# Patient Record
Sex: MALE | Race: WHITE | URBAN - METROPOLITAN AREA
[De-identification: names, ages, dates, MRNs, and addresses within clinical notes are randomized per-mention and may not be internally consistent; named-entity substitution may affect disease eponyms.]

---

## 2017-02-08 ENCOUNTER — TELEPHONE (OUTPATIENT)
Dept: FAMILY MEDICINE CLINIC | Facility: CLINIC | Age: 37
End: 2017-02-08

## 2017-02-08 DIAGNOSIS — S99.921A INJURY OF RIGHT GREAT TOE, INITIAL ENCOUNTER: Primary | ICD-10-CM

## 2017-02-08 NOTE — TELEPHONE ENCOUNTER
Called patient regarding appt this evening and asked him about the reason for his visit due to our xray machine is down. Patient stated that approx. 1 week ago his right great toe jammed in door accidentally.   He states that the nail is almost detached,

## 2017-03-01 ENCOUNTER — TELEPHONE (OUTPATIENT)
Dept: FAMILY MEDICINE CLINIC | Facility: CLINIC | Age: 37
End: 2017-03-01

## 2017-03-02 NOTE — TELEPHONE ENCOUNTER
Patient informed that Dr. Adam Chow does not have an opening tonite and we understand that he works until [facility] in [facility]. Patient states he understands and will go to the [facility] after his appt here with Nishant Craig.

## 2017-10-04 ENCOUNTER — OFFICE VISIT (OUTPATIENT)
Dept: FAMILY MEDICINE CLINIC | Facility: CLINIC | Age: 37
End: 2017-10-04

## 2017-10-04 VITALS
HEART RATE: 80 BPM | TEMPERATURE: 98 F | RESPIRATION RATE: 16 BRPM | BODY MASS INDEX: 31.64 KG/M2 | HEIGHT: 70 IN | DIASTOLIC BLOOD PRESSURE: 76 MMHG | SYSTOLIC BLOOD PRESSURE: 120 MMHG | WEIGHT: 221 LBS

## 2017-10-04 DIAGNOSIS — G44.229 CHRONIC TENSION-TYPE HEADACHE, NOT INTRACTABLE: Primary | ICD-10-CM

## 2017-10-04 PROCEDURE — 99214 OFFICE O/P EST MOD 30 MIN: CPT | Performed by: FAMILY MEDICINE

## 2017-10-04 RX ORDER — NAPROXEN 500 MG/1
500 TABLET ORAL 2 TIMES DAILY WITH MEALS
Qty: 40 TABLET | Refills: 2 | Status: SHIPPED | OUTPATIENT
Start: 2017-10-04 | End: 2017-11-01

## 2017-10-04 RX ORDER — SUMATRIPTAN 50 MG/1
50 TABLET, FILM COATED ORAL EVERY 2 HOUR PRN
Qty: 9 TABLET | Refills: 3 | Status: SHIPPED | OUTPATIENT
Start: 2017-10-04 | End: 2017-11-15

## 2017-10-04 NOTE — PATIENT INSTRUCTIONS
Start medication naprosyn - take regularly for 1 week , then as needed. imitrex at onset of headache, can repeat after 1 hour if needed.

## 2017-10-05 NOTE — PROGRESS NOTES
Chief Complaint:   Patient presents with:  Sinus Problem: Patient stated hes been having a constant headache behind the left eye, he has been a little sensitive to light and has felt dizzy      HPI:   This is a 30year old male coming in for intermittent h Denies shortness of breath, wheezing, cough or sputum production.     GASTROINTESTINAL:  Denies abdominal pain, nausea, vomiting, constipation, diarrhea  : denies dysuria,   MUSCULOSKELETAL:  Denies weakness, muscle aches,     NEUROLOGICAL:see HPI     HEM Visit:  Signed Prescriptions Disp Refills    naproxen 500 MG Oral Tab 40 tablet 2      Sig: Take 1 tablet (500 mg total) by mouth 2 (two) times daily with meals.       SUMAtriptan Succinate (IMITREX) 50 MG Oral Tab 9 tablet 3      Sig: Take 1 tablet (50 mg

## 2017-11-01 ENCOUNTER — OFFICE VISIT (OUTPATIENT)
Dept: FAMILY MEDICINE CLINIC | Facility: CLINIC | Age: 37
End: 2017-11-01

## 2017-11-01 VITALS
WEIGHT: 225.5 LBS | HEIGHT: 70.5 IN | BODY MASS INDEX: 31.92 KG/M2 | HEART RATE: 84 BPM | DIASTOLIC BLOOD PRESSURE: 78 MMHG | SYSTOLIC BLOOD PRESSURE: 122 MMHG | RESPIRATION RATE: 18 BRPM | TEMPERATURE: 97 F

## 2017-11-01 DIAGNOSIS — R51.9 INTRACTABLE HEADACHE, UNSPECIFIED CHRONICITY PATTERN, UNSPECIFIED HEADACHE TYPE: Primary | ICD-10-CM

## 2017-11-01 PROCEDURE — 99214 OFFICE O/P EST MOD 30 MIN: CPT | Performed by: FAMILY MEDICINE

## 2017-11-01 RX ORDER — SULFAMETHOXAZOLE AND TRIMETHOPRIM 800; 160 MG/1; MG/1
1 TABLET ORAL 2 TIMES DAILY
Qty: 20 TABLET | Refills: 0 | Status: SHIPPED | OUTPATIENT
Start: 2017-11-01 | End: 2017-11-15

## 2017-11-02 NOTE — PROGRESS NOTES
[facility] [place]  PROGRESS NOTE  Chief Complaint:   Patient presents with:  Sinus Problem  Headache      HPI:   This is a 30year old male coming in for 10 years headaches. This patient was seen last month for left frontal headaches.   Patient SUMAtriptan Succinate (IMITREX) 50 MG Oral Tab Take 1 tablet (50 mg total) by mouth every 2 (two) hours as needed for Migraine.  Use at onset; repeat once after 2 HRS-ONLY 2 IN 24 HR MAX Disp: 9 tablet Rfl: 3      Counseling given: Not Answered       REVI bruising, good turgor. HEART:  Regular rate and rhythm, no murmurs, rubs or gallops. Neuro: Cranial nerves II through XII are intact. Sensation motor normal.    ASSESSMENT AND PLAN:   Rosa Kurtz was seen today for sinus problem and headache.     Diagnoses and

## 2017-11-09 ENCOUNTER — TELEPHONE (OUTPATIENT)
Dept: FAMILY MEDICINE CLINIC | Facility: CLINIC | Age: 37
End: 2017-11-09

## 2017-11-09 NOTE — TELEPHONE ENCOUNTER
Pt was seen last week. Was given an abx which has helped with the headache but not the congestion or sinus pressure. Pt has tried Tresia Abbe but did not help. What else can pt take with the congestion and sinus pressure. Please advise.

## 2017-11-09 NOTE — TELEPHONE ENCOUNTER
Informed pt of the recommendations below. Pt will try these meds and call if no improvement. Pt expressed understanding and thanks.

## 2017-11-09 NOTE — TELEPHONE ENCOUNTER
I would like him to try an antihistamine namely Claritin or Zyrtec, taking this daily. I would also like him to try Flonase nasal spray taking 2 puffs each nostril at bedtime. Both of these medications are over-the-counter. Recheck if no improvement.

## 2017-11-15 ENCOUNTER — OFFICE VISIT (OUTPATIENT)
Dept: FAMILY MEDICINE CLINIC | Facility: CLINIC | Age: 37
End: 2017-11-15

## 2017-11-15 VITALS
BODY MASS INDEX: 32.93 KG/M2 | HEIGHT: 70 IN | WEIGHT: 230 LBS | DIASTOLIC BLOOD PRESSURE: 82 MMHG | SYSTOLIC BLOOD PRESSURE: 116 MMHG | HEART RATE: 88 BPM | TEMPERATURE: 97 F

## 2017-11-15 DIAGNOSIS — J32.9 SINUSITIS, UNSPECIFIED CHRONICITY, UNSPECIFIED LOCATION: Primary | ICD-10-CM

## 2017-11-15 PROCEDURE — 99213 OFFICE O/P EST LOW 20 MIN: CPT | Performed by: FAMILY MEDICINE

## 2017-11-15 RX ORDER — PREDNISONE 20 MG/1
TABLET ORAL
Qty: 24 TABLET | Refills: 0 | Status: SHIPPED | OUTPATIENT
Start: 2017-11-15 | End: 2018-03-05

## 2017-11-16 NOTE — PATIENT INSTRUCTIONS
Prednisone prescribed. Continue with antihistamine and Flonase nasal inhaler. Call at the completion of the prednisone for progress report.

## 2017-11-16 NOTE — PROGRESS NOTES
[facility] [place]  PROGRESS NOTE  Chief Complaint:   Patient presents with:  Sinus Problem  Headache      HPI:   This is a 30year old male coming in for continued facial pressure.   Patient feels that he has sinus problems and possible infectio then qd for 3 days Disp: 24 tablet Rfl: 0      Counseling given: Not Answered       REVIEW OF SYSTEMS:   Denies eye, ear, nose, throat, cardiovascular, respiratory, gastrointestinal, genitourinary, musculoskeletal, skin, or neurological sxs other than thos worsening or changing symptoms. Patient is to call with any side effects or complications from the treatments as a result of today.      Problem List:  Patient Active Problem List:     Inflammatory bowel disease     Personal history of urinary calculi

## 2017-11-28 ENCOUNTER — TELEPHONE (OUTPATIENT)
Dept: FAMILY MEDICINE CLINIC | Facility: CLINIC | Age: 37
End: 2017-11-28

## 2017-11-28 NOTE — TELEPHONE ENCOUNTER
Patient states that he is calling with an update for Dr. Cornelius Davila who he was seen by last.  He states that he completed the prednisone and his headaches are gone but his sinuses still feel full.   Patient states that it is ok to wait until tomorrow when

## 2017-11-29 NOTE — TELEPHONE ENCOUNTER
Patient's headaches are resolved with the prednisone but still has fullness in the sinuses. Tell patient that this is consistent with chronic sinusitis.   I would highly recommend him seeing the ear nose and throat physician again as well as continuing wit

## 2017-11-29 NOTE — TELEPHONE ENCOUNTER
Informed pt of the recommendations. Pt will schedule appt with ENT. Pt expressed understanding and thanks.

## 2018-03-05 ENCOUNTER — OFFICE VISIT (OUTPATIENT)
Dept: FAMILY MEDICINE CLINIC | Facility: CLINIC | Age: 38
End: 2018-03-05

## 2018-03-05 VITALS
SYSTOLIC BLOOD PRESSURE: 128 MMHG | DIASTOLIC BLOOD PRESSURE: 88 MMHG | TEMPERATURE: 98 F | BODY MASS INDEX: 32.76 KG/M2 | WEIGHT: 228.81 LBS | HEART RATE: 80 BPM | HEIGHT: 70 IN | RESPIRATION RATE: 16 BRPM

## 2018-03-05 DIAGNOSIS — Z23 NEED FOR TDAP VACCINATION: ICD-10-CM

## 2018-03-05 DIAGNOSIS — Z00.00 PHYSICAL EXAM: Primary | ICD-10-CM

## 2018-03-05 PROCEDURE — 99395 PREV VISIT EST AGE 18-39: CPT | Performed by: FAMILY MEDICINE

## 2018-03-05 PROCEDURE — 90715 TDAP VACCINE 7 YRS/> IM: CPT | Performed by: FAMILY MEDICINE

## 2018-03-05 PROCEDURE — 90471 IMMUNIZATION ADMIN: CPT | Performed by: FAMILY MEDICINE

## 2018-03-05 RX ORDER — FLUTICASONE PROPIONATE 50 MCG
SPRAY, SUSPENSION (ML) NASAL
COMMUNITY

## 2018-03-06 NOTE — PROGRESS NOTES
[facility]      HPI:   Leopoldo Galindo is a 30year old male who presents for an Annual Health Visit.          Allergies:     C-Phen                  Other (See Comments)    Comment:\"passed out\"  Allerx                      Comment:O motor complaint  PSYCHE: no symptoms of depression or anxiety  HEMATOLOGY: denies hx anemia; denies bruising or excessive bleeding  ENDOCRINE: denies excessive thirst or urination; denies unexpected wt gain or wt loss  ALLERGY/IMM.: denies food or seasonal Future    Need for Tdap vaccination  -     TETANUS, DIPHTHERIA TOXOIDS AND ACELLULAR PERTUSIS VACCINE (TDAP), >7 YEARS, IM USE        Patient Instructions   Tdap given today. Recommend healthy diet, exercise and weight loss.    Return to clinic for fastin

## 2018-03-06 NOTE — PATIENT INSTRUCTIONS
Tdap given today. Recommend healthy diet, exercise and weight loss. Return to clinic for fasting labs. Recommend 1200 mg of calcium and 800 units of vitamin D. Return to clinic if any concern.

## 2018-03-07 ENCOUNTER — TELEPHONE (OUTPATIENT)
Dept: FAMILY MEDICINE CLINIC | Facility: CLINIC | Age: 38
End: 2018-03-07

## 2018-03-07 NOTE — TELEPHONE ENCOUNTER
Patient calling today after having sxs from Tdap vaccination that was given on 3/5. He started having sxs 24 hours after vaccination: started to get headache/chills and temp was 99.9 yesterday and today at 5 pm was 100.4.  He states he has been taking ibupr

## 2018-03-08 NOTE — TELEPHONE ENCOUNTER
Pt informed that this is a normal reaction to the vaccination. Dr. Kassandra Duenas states he doesn't think this is a allergic reaction, just more of a reaction to the vaccination and its effect on the body.  He recommends to alternate between tylenol and ibu to get fev

## 2018-03-10 ENCOUNTER — LABORATORY ENCOUNTER (OUTPATIENT)
Dept: LAB | Age: 38
End: 2018-03-10
Attending: FAMILY MEDICINE
Payer: COMMERCIAL

## 2018-03-10 DIAGNOSIS — Z00.00 PHYSICAL EXAM: ICD-10-CM

## 2018-03-10 LAB
ALBUMIN SERPL-MCNC: 3.4 G/DL (ref 3.5–4.8)
ALP LIVER SERPL-CCNC: 80 U/L (ref 45–117)
ALT SERPL-CCNC: 36 U/L (ref 17–63)
AST SERPL-CCNC: 29 U/L (ref 15–41)
BASOPHILS # BLD AUTO: 0.03 X10(3) UL (ref 0–0.1)
BASOPHILS NFR BLD AUTO: 0.6 %
BILIRUB SERPL-MCNC: 0.4 MG/DL (ref 0.1–2)
BILIRUB UR QL STRIP.AUTO: NEGATIVE
BUN BLD-MCNC: 18 MG/DL (ref 8–20)
CALCIUM BLD-MCNC: 8.8 MG/DL (ref 8.3–10.3)
CHLORIDE: 106 MMOL/L (ref 101–111)
CHOLEST SMN-MCNC: 218 MG/DL (ref ?–200)
CLARITY UR REFRACT.AUTO: CLEAR
CO2: 27 MMOL/L (ref 22–32)
COLOR UR AUTO: YELLOW
CREAT BLD-MCNC: 0.92 MG/DL (ref 0.7–1.3)
EOSINOPHIL # BLD AUTO: 0.29 X10(3) UL (ref 0–0.3)
EOSINOPHIL NFR BLD AUTO: 5.9 %
ERYTHROCYTE [DISTWIDTH] IN BLOOD BY AUTOMATED COUNT: 12.4 % (ref 11.5–16)
EST. AVERAGE GLUCOSE BLD GHB EST-MCNC: 108 MG/DL (ref 68–126)
GLUCOSE BLD-MCNC: 92 MG/DL (ref 70–99)
GLUCOSE UR STRIP.AUTO-MCNC: NEGATIVE MG/DL
HBA1C MFR BLD HPLC: 5.4 % (ref ?–5.7)
HCT VFR BLD AUTO: 41.6 % (ref 37–53)
HDLC SERPL-MCNC: 43 MG/DL (ref 45–?)
HDLC SERPL: 5.07 {RATIO} (ref ?–4.97)
HGB BLD-MCNC: 14.1 G/DL (ref 13–17)
IMMATURE GRANULOCYTE COUNT: 0.01 X10(3) UL (ref 0–1)
IMMATURE GRANULOCYTE RATIO %: 0.2 %
KETONES UR STRIP.AUTO-MCNC: NEGATIVE MG/DL
LDLC SERPL CALC-MCNC: 156 MG/DL (ref ?–130)
LEUKOCYTE ESTERASE UR QL STRIP.AUTO: NEGATIVE
LYMPHOCYTES # BLD AUTO: 1.59 X10(3) UL (ref 0.9–4)
LYMPHOCYTES NFR BLD AUTO: 32.5 %
M PROTEIN MFR SERPL ELPH: 6.8 G/DL (ref 6.1–8.3)
MCH RBC QN AUTO: 31.6 PG (ref 27–33.2)
MCHC RBC AUTO-ENTMCNC: 33.9 G/DL (ref 31–37)
MCV RBC AUTO: 93.3 FL (ref 80–99)
MONOCYTES # BLD AUTO: 0.54 X10(3) UL (ref 0.1–1)
MONOCYTES NFR BLD AUTO: 11 %
NEUTROPHIL ABS PRELIM: 2.43 X10 (3) UL (ref 1.3–6.7)
NEUTROPHILS # BLD AUTO: 2.43 X10(3) UL (ref 1.3–6.7)
NEUTROPHILS NFR BLD AUTO: 49.8 %
NITRITE UR QL STRIP.AUTO: NEGATIVE
NONHDLC SERPL-MCNC: 175 MG/DL (ref ?–130)
PH UR STRIP.AUTO: 5 [PH] (ref 4.5–8)
PLATELET # BLD AUTO: 163 10(3)UL (ref 150–450)
POTASSIUM SERPL-SCNC: 3.9 MMOL/L (ref 3.6–5.1)
PROT UR STRIP.AUTO-MCNC: NEGATIVE MG/DL
RBC # BLD AUTO: 4.46 X10(6)UL (ref 4.3–5.7)
RBC UR QL AUTO: NEGATIVE
RED CELL DISTRIBUTION WIDTH-SD: 42.5 FL (ref 35.1–46.3)
SODIUM SERPL-SCNC: 139 MMOL/L (ref 136–144)
SP GR UR STRIP.AUTO: 1.02 (ref 1–1.03)
TRIGL SERPL-MCNC: 94 MG/DL (ref ?–150)
TSI SER-ACNC: 1.56 MIU/ML (ref 0.35–5.5)
UROBILINOGEN UR STRIP.AUTO-MCNC: <2 MG/DL
VLDLC SERPL CALC-MCNC: 19 MG/DL (ref 5–40)
WBC # BLD AUTO: 4.9 X10(3) UL (ref 4–13)

## 2018-03-10 PROCEDURE — 83036 HEMOGLOBIN GLYCOSYLATED A1C: CPT | Performed by: FAMILY MEDICINE

## 2018-03-10 PROCEDURE — 81003 URINALYSIS AUTO W/O SCOPE: CPT | Performed by: FAMILY MEDICINE

## 2018-03-10 PROCEDURE — 36415 COLL VENOUS BLD VENIPUNCTURE: CPT | Performed by: FAMILY MEDICINE

## 2018-03-10 PROCEDURE — 80050 GENERAL HEALTH PANEL: CPT | Performed by: FAMILY MEDICINE

## 2018-03-10 PROCEDURE — 80061 LIPID PANEL: CPT | Performed by: FAMILY MEDICINE

## 2018-03-12 ENCOUNTER — TELEPHONE (OUTPATIENT)
Dept: FAMILY MEDICINE CLINIC | Facility: CLINIC | Age: 38
End: 2018-03-12

## 2018-03-12 NOTE — TELEPHONE ENCOUNTER
----- Message from Leila Prieto MD sent at 3/12/2018 11:36 AM CDT -----  Please inform patient that his labs are okay except slightly elevated total cholesterol and LDL cholesterol. Advice low cholesterol diet and exercise. May use fish oil supplement.

## 2018-04-26 ENCOUNTER — OFFICE VISIT (OUTPATIENT)
Dept: FAMILY MEDICINE CLINIC | Facility: CLINIC | Age: 38
End: 2018-04-26

## 2018-04-26 VITALS
DIASTOLIC BLOOD PRESSURE: 84 MMHG | WEIGHT: 228 LBS | HEIGHT: 72 IN | OXYGEN SATURATION: 97 % | SYSTOLIC BLOOD PRESSURE: 124 MMHG | HEART RATE: 104 BPM | RESPIRATION RATE: 14 BRPM | BODY MASS INDEX: 30.88 KG/M2 | TEMPERATURE: 98 F

## 2018-04-26 DIAGNOSIS — J20.9 ACUTE BRONCHITIS, UNSPECIFIED ORGANISM: Primary | ICD-10-CM

## 2018-04-26 PROCEDURE — 99214 OFFICE O/P EST MOD 30 MIN: CPT | Performed by: FAMILY MEDICINE

## 2018-04-26 RX ORDER — SULFAMETHOXAZOLE AND TRIMETHOPRIM 800; 160 MG/1; MG/1
1 TABLET ORAL 2 TIMES DAILY
Qty: 20 TABLET | Refills: 0 | Status: SHIPPED | OUTPATIENT
Start: 2018-04-26 | End: 2018-05-06

## 2018-04-26 NOTE — PROGRESS NOTES
Chief Complaint:   Patient presents with:  Cough: chest congestionX1 week  Diarrhea: headach body aches started today      HPI:   This is a 30year old male coming in for acute illness regarding increasing cough with productive phlegm having some blood. pain, nausea, vomiting, constipation, diarrhea  : denies dysuria, no urinary frequency , no discharge.   MUSCULOSKELETAL:  Denies weakness, muscle aches,   NEUROLOGICAL:  Denies headache, dizziness,       EXAM:   /84 (BP Location: Right arm, Patient call with any questions, complications, allergies, or worsening or changing symptoms. Patient is to call with any side effects or complications from the treatments as a result of today.      Problem List:  Patient Active Problem List:     Inflammatory tyler

## 2018-04-26 NOTE — PATIENT INSTRUCTIONS
Start antibiotic     Continue with mucinex twice daily for 3 - 7 days.      Continue with ibuprofen 2 tabs three times per day for 2 -5 days

## 2018-08-13 ENCOUNTER — OFFICE VISIT (OUTPATIENT)
Dept: FAMILY MEDICINE CLINIC | Facility: CLINIC | Age: 38
End: 2018-08-13
Payer: COMMERCIAL

## 2018-08-13 ENCOUNTER — APPOINTMENT (OUTPATIENT)
Dept: LAB | Age: 38
End: 2018-08-13
Attending: FAMILY MEDICINE
Payer: COMMERCIAL

## 2018-08-13 VITALS
HEIGHT: 72 IN | BODY MASS INDEX: 34.84 KG/M2 | HEART RATE: 76 BPM | RESPIRATION RATE: 16 BRPM | OXYGEN SATURATION: 99 % | SYSTOLIC BLOOD PRESSURE: 110 MMHG | WEIGHT: 257.25 LBS | DIASTOLIC BLOOD PRESSURE: 88 MMHG | TEMPERATURE: 96 F

## 2018-08-13 DIAGNOSIS — R50.9 INTERMITTENT FEVER OF UNKNOWN ORIGIN: ICD-10-CM

## 2018-08-13 DIAGNOSIS — R68.89 CHANGE IN BLOOD PRESSURE: Primary | ICD-10-CM

## 2018-08-13 LAB
ALBUMIN SERPL-MCNC: 3.9 G/DL (ref 3.5–4.8)
ALBUMIN/GLOB SERPL: 1 {RATIO} (ref 1–2)
ALP LIVER SERPL-CCNC: 91 U/L (ref 45–117)
ALT SERPL-CCNC: 30 U/L (ref 17–63)
ANION GAP SERPL CALC-SCNC: 8 MMOL/L (ref 0–18)
AST SERPL-CCNC: 27 U/L (ref 15–41)
BASOPHILS # BLD AUTO: 0.05 X10(3) UL (ref 0–0.1)
BASOPHILS NFR BLD AUTO: 0.7 %
BILIRUB SERPL-MCNC: 0.4 MG/DL (ref 0.1–2)
BILIRUB UR QL STRIP.AUTO: NEGATIVE
BUN BLD-MCNC: 17 MG/DL (ref 8–20)
BUN/CREAT SERPL: 15.9 (ref 10–20)
CALCIUM BLD-MCNC: 8.7 MG/DL (ref 8.3–10.3)
CHLORIDE SERPL-SCNC: 106 MMOL/L (ref 101–111)
CLARITY UR REFRACT.AUTO: CLEAR
CO2 SERPL-SCNC: 25 MMOL/L (ref 22–32)
CREAT BLD-MCNC: 1.07 MG/DL (ref 0.7–1.3)
EOSINOPHIL # BLD AUTO: 0.12 X10(3) UL (ref 0–0.3)
EOSINOPHIL NFR BLD AUTO: 1.7 %
ERYTHROCYTE [DISTWIDTH] IN BLOOD BY AUTOMATED COUNT: 13.1 % (ref 11.5–16)
GLOBULIN PLAS-MCNC: 3.8 G/DL (ref 2.5–3.7)
GLUCOSE BLD-MCNC: 84 MG/DL (ref 70–99)
GLUCOSE UR STRIP.AUTO-MCNC: NEGATIVE MG/DL
HCT VFR BLD AUTO: 48 % (ref 37–53)
HGB BLD-MCNC: 15.7 G/DL (ref 13–17)
IMMATURE GRANULOCYTE COUNT: 0.04 X10(3) UL (ref 0–1)
IMMATURE GRANULOCYTE RATIO %: 0.6 %
KETONES UR STRIP.AUTO-MCNC: NEGATIVE MG/DL
LEUKOCYTE ESTERASE UR QL STRIP.AUTO: NEGATIVE
LYMPHOCYTES # BLD AUTO: 1.86 X10(3) UL (ref 0.9–4)
LYMPHOCYTES NFR BLD AUTO: 25.8 %
M PROTEIN MFR SERPL ELPH: 7.7 G/DL (ref 6.1–8.3)
MCH RBC QN AUTO: 29.8 PG (ref 27–33.2)
MCHC RBC AUTO-ENTMCNC: 32.7 G/DL (ref 31–37)
MCV RBC AUTO: 91.1 FL (ref 80–99)
MONOCYTES # BLD AUTO: 0.62 X10(3) UL (ref 0.1–1)
MONOCYTES NFR BLD AUTO: 8.6 %
NEUTROPHIL ABS PRELIM: 4.51 X10 (3) UL (ref 1.3–6.7)
NEUTROPHILS # BLD AUTO: 4.51 X10(3) UL (ref 1.3–6.7)
NEUTROPHILS NFR BLD AUTO: 62.6 %
NITRITE UR QL STRIP.AUTO: NEGATIVE
OSMOLALITY SERPL CALC.SUM OF ELEC: 289 MOSM/KG (ref 275–295)
PH UR STRIP.AUTO: 6 [PH] (ref 4.5–8)
PLATELET # BLD AUTO: 187 10(3)UL (ref 150–450)
POTASSIUM SERPL-SCNC: 3.7 MMOL/L (ref 3.6–5.1)
PROT UR STRIP.AUTO-MCNC: NEGATIVE MG/DL
RBC # BLD AUTO: 5.27 X10(6)UL (ref 4.3–5.7)
RBC UR QL AUTO: NEGATIVE
RED CELL DISTRIBUTION WIDTH-SD: 43.8 FL (ref 35.1–46.3)
SODIUM SERPL-SCNC: 139 MMOL/L (ref 136–144)
SP GR UR STRIP.AUTO: 1.01 (ref 1–1.03)
TSI SER-ACNC: 1.27 MIU/ML (ref 0.35–5.5)
UROBILINOGEN UR STRIP.AUTO-MCNC: <2 MG/DL
WBC # BLD AUTO: 7.2 X10(3) UL (ref 4–13)

## 2018-08-13 PROCEDURE — 80050 GENERAL HEALTH PANEL: CPT | Performed by: FAMILY MEDICINE

## 2018-08-13 PROCEDURE — 81003 URINALYSIS AUTO W/O SCOPE: CPT | Performed by: FAMILY MEDICINE

## 2018-08-13 PROCEDURE — 36415 COLL VENOUS BLD VENIPUNCTURE: CPT | Performed by: FAMILY MEDICINE

## 2018-08-13 PROCEDURE — 99214 OFFICE O/P EST MOD 30 MIN: CPT | Performed by: FAMILY MEDICINE

## 2018-08-13 NOTE — PATIENT INSTRUCTIONS
Check labs today. Discussed possible cause of fever. Recommend to monitor blood pressure and fever and keep a log. Return to clinic with log book in 2 weeks. Return sooner if symptoms worse.    Return to clinic if systolic blood pressure more than 1

## 2018-08-14 NOTE — PROGRESS NOTES
[address]  PROGRESS NOTE  Chief Complaint:   Patient presents with:  Blood Pressure: Having high and low b/p reads, L ankle broken 7 weeks ago. Fever:  On and off x couple months      HPI:   This is a 41year old male presents complaini Comment:Other reaction(s): passes out, nausea  Chlorine                    Comment:Other reaction(s): hives, passes out  Clarithromycin              Comment:Other reaction(s): hives  Penicillin G                Comment:Other reaction(s): trouble br reviewed. Physical Exam:  GEN:  Patient is alert, awake and oriented, well developed, well nourished, no acute distress. EYES:  Sclera anicteric, conjunctiva normal, PERRLA, EOMI.   HEAD: Normocephalic, atraumatic   EARS:  External normal.  Bilateral tymp Also return if fever more than 100.5 F and it does not improve. Return to clinic if no improvement           Health Maintenance: There are no preventive care reminders to display for this patient.     Patient/Caregiver Education: Patient/Caregiver Educ

## 2018-08-15 ENCOUNTER — TELEPHONE (OUTPATIENT)
Dept: FAMILY MEDICINE CLINIC | Facility: CLINIC | Age: 38
End: 2018-08-15

## 2018-08-15 NOTE — TELEPHONE ENCOUNTER
----- Message from Niki Belcher MD sent at 8/15/2018  9:02 AM CDT -----  Please inform patient that labs are within normal range. Recommend to return to clinic if his symptoms does not improve.

## 2018-08-29 ENCOUNTER — OFFICE VISIT (OUTPATIENT)
Dept: FAMILY MEDICINE CLINIC | Facility: CLINIC | Age: 38
End: 2018-08-29
Payer: COMMERCIAL

## 2018-08-29 VITALS
HEIGHT: 72 IN | SYSTOLIC BLOOD PRESSURE: 116 MMHG | WEIGHT: 232.81 LBS | BODY MASS INDEX: 31.53 KG/M2 | OXYGEN SATURATION: 96 % | TEMPERATURE: 98 F | DIASTOLIC BLOOD PRESSURE: 82 MMHG | HEART RATE: 94 BPM | RESPIRATION RATE: 16 BRPM

## 2018-08-29 DIAGNOSIS — I10 ESSENTIAL HYPERTENSION: Primary | ICD-10-CM

## 2018-08-29 PROCEDURE — 99213 OFFICE O/P EST LOW 20 MIN: CPT | Performed by: FAMILY MEDICINE

## 2018-08-29 NOTE — PROGRESS NOTES
Chief Complaint:   Patient presents with:  Blood Pressure  Fever      HPI:   This is a 41year old male coming in for follow-up care regarding blood pressures. Patient had a accident and broke his left ankle.   In the emergency room blood pressures are bot Negative Negative   Microscopic Microscopic not indicated    -CBC W/ DIFFERENTIAL   Result Value Ref Range   WBC 7.2 4.0 - 13.0 x10(3) uL   RBC 5.27 4.30 - 5.70 x10(6)uL   HGB 15.7 13.0 - 17.0 g/dL   HCT 48.0 37.0 - 53.0 %   .0 150.0 - 450.0 10(3)uL Comment:Other reaction(s): trouble breathing       REVIEW OF SYSTEMS:   CONSTITUTIONAL:  Denies , fever, chills, or fatigue,  EENT:  Eyes:  Denies eye pain, visual changes,   Ears, Nose, Throat:  Denies hearing loss,or disturbance.  Denies sore throat  INTE intake. Monitor blood pressures 2times per month. Meds & Refills for this Visit:  No prescriptions requested or ordered in this encounter    Patient Instructions   Good exam today     Labs good     Focus on healthy nutrition and some activity.

## 2018-08-29 NOTE — PATIENT INSTRUCTIONS
Good exam today     Labs good     Focus on healthy nutrition and some activity.      Recheck in 6 months for physical

## 2019-02-01 ENCOUNTER — OFFICE VISIT (OUTPATIENT)
Dept: FAMILY MEDICINE CLINIC | Facility: CLINIC | Age: 39
End: 2019-02-01
Payer: COMMERCIAL

## 2019-02-01 VITALS
TEMPERATURE: 98 F | SYSTOLIC BLOOD PRESSURE: 120 MMHG | HEIGHT: 72 IN | OXYGEN SATURATION: 99 % | BODY MASS INDEX: 31.4 KG/M2 | HEART RATE: 102 BPM | RESPIRATION RATE: 18 BRPM | WEIGHT: 231.81 LBS | DIASTOLIC BLOOD PRESSURE: 72 MMHG

## 2019-02-01 DIAGNOSIS — J32.9 SINOBRONCHITIS: Primary | ICD-10-CM

## 2019-02-01 DIAGNOSIS — J40 SINOBRONCHITIS: Primary | ICD-10-CM

## 2019-02-01 PROCEDURE — 99214 OFFICE O/P EST MOD 30 MIN: CPT | Performed by: NURSE PRACTITIONER

## 2019-02-01 RX ORDER — DOXYCYCLINE HYCLATE 100 MG/1
100 CAPSULE ORAL 2 TIMES DAILY
Qty: 20 CAPSULE | Refills: 0 | Status: SHIPPED | OUTPATIENT
Start: 2019-02-01 | End: 2019-02-11

## 2019-02-01 NOTE — PROGRESS NOTES
HPI:    Patient ID: Valeriano Ferguson is a 41year old male. HPI     Patient is present with complaints of URI congestion that started over 2 weeks ago. Tried OTC medications and helps temporarily. Exposed to co-workers who have been sick.    No fever, tympanic membrane, external ear and ear canal normal.   Nose: Mucosal edema (with right>>left) present. Left sinus exhibits maxillary sinus tenderness. Mouth/Throat: Posterior oropharyngeal erythema present.    Eyes: Conjunctivae are normal.   Neck: Rene Shaper

## 2019-02-01 NOTE — PATIENT INSTRUCTIONS
Directed to take antibiotics until gone. Recommend to treat symptoms as needed. Return to clinic if not better in 48-72 hours. Otherwise follow-up as needed.

## 2019-02-07 ENCOUNTER — OFFICE VISIT (OUTPATIENT)
Dept: FAMILY MEDICINE CLINIC | Facility: CLINIC | Age: 39
End: 2019-02-07
Payer: COMMERCIAL

## 2019-02-07 VITALS
BODY MASS INDEX: 31.64 KG/M2 | TEMPERATURE: 97 F | DIASTOLIC BLOOD PRESSURE: 80 MMHG | HEIGHT: 72 IN | WEIGHT: 233.63 LBS | SYSTOLIC BLOOD PRESSURE: 130 MMHG | HEART RATE: 95 BPM

## 2019-02-07 DIAGNOSIS — R55 SYNCOPE, UNSPECIFIED SYNCOPE TYPE: ICD-10-CM

## 2019-02-07 DIAGNOSIS — I10 ESSENTIAL HYPERTENSION: Primary | ICD-10-CM

## 2019-02-07 PROCEDURE — 99214 OFFICE O/P EST MOD 30 MIN: CPT | Performed by: FAMILY MEDICINE

## 2019-02-07 PROCEDURE — 1111F DSCHRG MED/CURRENT MED MERGE: CPT | Performed by: FAMILY MEDICINE

## 2019-02-07 NOTE — PROGRESS NOTES
[facility] [place]  PROGRESS NOTE  Chief Complaint:   Patient presents with:  Hospital F/U: ER follow up- passed out interaction between decongestant/allergy med and doxycycline, Tuesday      HPI:   This is a 41year old male presents to clinic Comment:Other reaction(s): hives, passes out  Clarithromycin              Comment:Other reaction(s): hives  Penicillin G                Comment:Other reaction(s): trouble breathing  Current Meds:    Current Outpatient Medications:  Doxycycline Hyclate 100 233 lb 9.6 oz. Vital signs reviewed. Physical Exam:  GEN:  Patient is alert, awake and oriented, well developed, well nourished, no acute distress. EYES:  Sclera anicteric, conjunctiva normal, PERRLA, EOMI.   HEAD: Normocephalic, atraumatic   EARS:  Ext 11/15/2018    Patient/Caregiver Education: Patient/Caregiver Education: There are no barriers to learning. Medical education done. Outcome: Patient verbalizes understanding.  Patient is notified to call with any questions, complications, allergies, or wor

## 2019-02-07 NOTE — PATIENT INSTRUCTIONS
Avoid cold medication with diphenhydramine and phenylephrine. Finish the course of antibiotics. Schedule stress echocardiogram at [facility].   Return to clinic if symptoms returns.

## 2019-02-21 ENCOUNTER — TELEPHONE (OUTPATIENT)
Dept: FAMILY MEDICINE CLINIC | Facility: CLINIC | Age: 39
End: 2019-02-21

## 2019-02-21 NOTE — TELEPHONE ENCOUNTER
Please inform patient that his stress echocardiogram is normal.  Recommend to return to clinic if his symptoms does not improve, or if he has any concerns.

## 2019-02-21 NOTE — TELEPHONE ENCOUNTER
----- Message from Pradeep Jett sent at 2/21/2019 12:14 PM CST -----  Contact: PT  GIOVANNY   Please call back after lunch

## 2019-03-06 ENCOUNTER — OFFICE VISIT (OUTPATIENT)
Dept: FAMILY MEDICINE CLINIC | Facility: CLINIC | Age: 39
End: 2019-03-06
Payer: COMMERCIAL

## 2019-03-06 VITALS
HEIGHT: 72 IN | SYSTOLIC BLOOD PRESSURE: 122 MMHG | RESPIRATION RATE: 14 BRPM | HEART RATE: 86 BPM | DIASTOLIC BLOOD PRESSURE: 64 MMHG | WEIGHT: 229.63 LBS | BODY MASS INDEX: 31.1 KG/M2 | TEMPERATURE: 98 F | OXYGEN SATURATION: 98 %

## 2019-03-06 DIAGNOSIS — Z00.00 HEALTH CARE MAINTENANCE: Primary | ICD-10-CM

## 2019-03-06 PROBLEM — J20.9 ACUTE BRONCHITIS: Status: RESOLVED | Noted: 2018-04-26 | Resolved: 2019-03-06

## 2019-03-06 PROCEDURE — 99395 PREV VISIT EST AGE 18-39: CPT | Performed by: FAMILY MEDICINE

## 2019-03-07 NOTE — PROGRESS NOTES
Chief Complaint:   Patient presents with:  Physical      HPI:   This is a 41year old male coming in for healthcare check. I discussed with him heart disease prevention immunizations past medical history. Discussed family history.   Patient recently with DIFFERENTIAL   Result Value Ref Range    WBC 7.2 4.0 - 13.0 x10(3) uL    RBC 5.27 4.30 - 5.70 x10(6)uL    HGB 15.7 13.0 - 17.0 g/dL    HCT 48.0 37.0 - 53.0 %    .0 150.0 - 450.0 10(3)uL    MCV 91.1 80.0 - 99.0 fL    MCH 29.8 27.0 - 33.2 pg    MCHC 3 use: Yes      Drug use: No    Other Topics      Concerns:       Current Meds:    Current Outpatient Medications:  LANSOPRAZOLE OR Take 1 tablet by mouth as needed.  Disp:  Rfl:    Fluticasone Propionate 50 MCG/ACT Nasal Suspension by Each Nare route daily a awake and oriented, well developed, well nourished  Male      , no apparent distress.     HEENT:  Head:  Normocephalic, atraumatic   Eyes: EOMI, PERRLA, no scleral icterus, conjunctivae clear bilaterally, no eye discharge   Ears: External normal. Nose: santana

## 2019-03-16 ENCOUNTER — APPOINTMENT (OUTPATIENT)
Dept: LAB | Age: 39
End: 2019-03-16
Attending: FAMILY MEDICINE
Payer: COMMERCIAL

## 2019-03-16 DIAGNOSIS — Z00.00 HEALTH CARE MAINTENANCE: ICD-10-CM

## 2019-03-16 LAB
BILIRUB UR QL STRIP.AUTO: NEGATIVE
CHOLEST SMN-MCNC: 230 MG/DL (ref ?–200)
CLARITY UR REFRACT.AUTO: CLEAR
COLOR UR AUTO: YELLOW
GLUCOSE UR STRIP.AUTO-MCNC: NEGATIVE MG/DL
HDLC SERPL-MCNC: 45 MG/DL (ref 40–59)
KETONES UR STRIP.AUTO-MCNC: NEGATIVE MG/DL
LDLC SERPL CALC-MCNC: 160 MG/DL (ref ?–100)
LEUKOCYTE ESTERASE UR QL STRIP.AUTO: NEGATIVE
NITRITE UR QL STRIP.AUTO: NEGATIVE
NONHDLC SERPL-MCNC: 185 MG/DL (ref ?–130)
PH UR STRIP.AUTO: 5 [PH] (ref 4.5–8)
PROT UR STRIP.AUTO-MCNC: NEGATIVE MG/DL
RBC UR QL AUTO: NEGATIVE
SP GR UR STRIP.AUTO: 1.02 (ref 1–1.03)
TRIGL SERPL-MCNC: 125 MG/DL (ref 30–149)
UROBILINOGEN UR STRIP.AUTO-MCNC: <2 MG/DL
VLDLC SERPL CALC-MCNC: 25 MG/DL (ref 0–30)

## 2019-03-16 PROCEDURE — 81003 URINALYSIS AUTO W/O SCOPE: CPT

## 2019-03-16 PROCEDURE — 36415 COLL VENOUS BLD VENIPUNCTURE: CPT

## 2019-03-16 PROCEDURE — 80061 LIPID PANEL: CPT

## 2019-03-18 ENCOUNTER — TELEPHONE (OUTPATIENT)
Dept: FAMILY MEDICINE CLINIC | Facility: CLINIC | Age: 39
End: 2019-03-18

## 2019-03-18 NOTE — TELEPHONE ENCOUNTER
Patient notified of the below results and recommendations and expressed understanding. Faxed physician results form to number provided on form. Made a copy to be scanned into patient's chart.

## 2019-03-18 NOTE — TELEPHONE ENCOUNTER
----- Message from Jun Mckenzie MD sent at 3/17/2019  8:51 AM CDT -----  Labs reviewed     Cholesterol total 230 /  -  This has risen from last check - recommend low fat / low cholesterol diet and recheck in 3 months -  Patient may need medicati

## 2019-03-23 ENCOUNTER — OFFICE VISIT (OUTPATIENT)
Dept: FAMILY MEDICINE CLINIC | Facility: CLINIC | Age: 39
End: 2019-03-23
Payer: COMMERCIAL

## 2019-03-23 VITALS
DIASTOLIC BLOOD PRESSURE: 78 MMHG | BODY MASS INDEX: 31.15 KG/M2 | WEIGHT: 230 LBS | SYSTOLIC BLOOD PRESSURE: 102 MMHG | HEIGHT: 72 IN | RESPIRATION RATE: 18 BRPM | TEMPERATURE: 98 F | HEART RATE: 103 BPM

## 2019-03-23 DIAGNOSIS — T14.8XXA BRUISING: Primary | ICD-10-CM

## 2019-03-23 PROCEDURE — 99213 OFFICE O/P EST LOW 20 MIN: CPT | Performed by: FAMILY MEDICINE

## 2019-03-23 NOTE — PROGRESS NOTES
[facility] [place]  PROGRESS NOTE  Chief Complaint:   Patient presents with:  Bruising: right arm , 1 week  Arm Pain      HPI:   This is a 41year old male presents to clinic complaining of bruising and pain on his right arm around the elbow reg OR Take 1 tablet by mouth as needed. Disp:  Rfl:    Fluticasone Propionate 50 MCG/ACT Nasal Suspension by Each Nare route daily as needed.    Disp:  Rfl:       Counseling given: Not Answered         REVIEW OF SYSTEMS:   CONSTITUTIONAL:  Denies unusual weigh seen today for bruising and arm pain. Diagnoses and all orders for this visit:    Bruising  Comments:  R upper arm        Patient Instructions   Recommend ice pack 2-3 times a day. Avoid any aspirin or ibuprofen.    Return to clinic if any increase red

## 2019-03-23 NOTE — PATIENT INSTRUCTIONS
Recommend ice pack 2-3 times a day. Avoid any aspirin or ibuprofen. Return to clinic if any increase redness, pain, warmth, fever or oozing.

## 2019-06-04 ENCOUNTER — OFFICE VISIT (OUTPATIENT)
Dept: FAMILY MEDICINE CLINIC | Facility: CLINIC | Age: 39
End: 2019-06-04
Payer: COMMERCIAL

## 2019-06-04 VITALS
BODY MASS INDEX: 31.02 KG/M2 | HEART RATE: 72 BPM | HEIGHT: 72 IN | OXYGEN SATURATION: 98 % | SYSTOLIC BLOOD PRESSURE: 114 MMHG | WEIGHT: 229 LBS | RESPIRATION RATE: 16 BRPM | TEMPERATURE: 97 F | DIASTOLIC BLOOD PRESSURE: 76 MMHG

## 2019-06-04 DIAGNOSIS — J01.00 ACUTE NON-RECURRENT MAXILLARY SINUSITIS: Primary | ICD-10-CM

## 2019-06-04 PROCEDURE — 99214 OFFICE O/P EST MOD 30 MIN: CPT | Performed by: NURSE PRACTITIONER

## 2019-06-04 RX ORDER — SULFAMETHOXAZOLE AND TRIMETHOPRIM 800; 160 MG/1; MG/1
1 TABLET ORAL 2 TIMES DAILY
Qty: 20 TABLET | Refills: 0 | Status: SHIPPED | OUTPATIENT
Start: 2019-06-04 | End: 2019-06-14

## 2019-06-04 NOTE — PROGRESS NOTES
HPI:    Patient ID: Osmani Baker is a 42year old male. HPI     Patient is present with complaints of ST and  congestion for the past 3 weeks. Now wife is also getting sick. No known exposure  Has been taking cough drops and Flonase.    Denies sinus Prescriptions     Signed Prescriptions Disp Refills   • Sulfamethoxazole-TMP -160 MG Oral Tab per tablet 20 tablet 0     Sig: Take 1 tablet by mouth 2 (two) times daily for 10 days.        Imaging & Referrals:  None      Patient Instructions   Directe

## 2019-06-04 NOTE — PATIENT INSTRUCTIONS
Directed to take Bactrim until gone. Treat symptoms as needed. Return to clinic if not better in 48-72 hours. Otherwise follow-up as needed.

## 2020-02-11 ENCOUNTER — OFFICE VISIT (OUTPATIENT)
Dept: FAMILY MEDICINE CLINIC | Facility: CLINIC | Age: 40
End: 2020-02-11
Payer: COMMERCIAL

## 2020-02-11 VITALS
SYSTOLIC BLOOD PRESSURE: 110 MMHG | OXYGEN SATURATION: 95 % | RESPIRATION RATE: 18 BRPM | HEART RATE: 116 BPM | DIASTOLIC BLOOD PRESSURE: 60 MMHG | WEIGHT: 226 LBS | HEIGHT: 72 IN | BODY MASS INDEX: 30.61 KG/M2 | TEMPERATURE: 101 F

## 2020-02-11 DIAGNOSIS — R68.89 FLU-LIKE SYMPTOMS: Primary | ICD-10-CM

## 2020-02-11 PROCEDURE — 99213 OFFICE O/P EST LOW 20 MIN: CPT | Performed by: NURSE PRACTITIONER

## 2020-02-11 RX ORDER — OSELTAMIVIR PHOSPHATE 75 MG/1
75 CAPSULE ORAL 2 TIMES DAILY
Qty: 10 CAPSULE | Refills: 0 | Status: SHIPPED | OUTPATIENT
Start: 2020-02-11 | End: 2020-02-16

## 2020-02-11 NOTE — PROGRESS NOTES
HPI:    Patient ID: Alo Dunn is a 42year old male. HPI     Started yesterday with cough and then got chills with congestion, nausea, and diarrhea. Stool is mostly liquid. Co-workers have been sick. No recent travel.    [place] [address] for th Nose: Mucosal edema present. Right sinus exhibits no maxillary sinus tenderness and no frontal sinus tenderness. Left sinus exhibits no maxillary sinus tenderness and no frontal sinus tenderness. Mouth/Throat: Posterior oropharyngeal erythema present. Influenza is also called the flu. It is a viral illness that affects the air passages of your lungs. It is different from the common cold. The flu can easily be passed from one to person to another.  It may be spread through the air by coughing and sneezing If you are age 72 or older, talk with your provider about getting a pneumococcal vaccine every 5 years. You should also get this vaccine if you have chronic asthma or COPD. All adults should get a flu vaccine every fall. Ask your provider about this.   When Talk to your pediatrician regarding the use of this medicine in children. While this drug may be prescribed for children as young as 14 days for selected conditions, precautions do apply. What side effects may I notice from receiving this medicine?   Side If you have the flu, you may be at an increased risk of developing seizures, confusion, or abnormal behavior. This occurs early in the illness, and more frequently in children and teens.  These events are not common, but may result in accidental injury to t

## 2020-02-11 NOTE — PATIENT INSTRUCTIONS
Treat symptoms as needed. Encourage fluids. Acetaminophen (e.g. Tylenol) or ibuprofen (e.g. Advil) for the fever as needed. No public contact until afebrile without taking a fever reducing medication for at least 24 hours.     Return to clinic if sym nose and sinuses. Don’t use a decongestant if you have high blood pressure. · Stay home until your fever has been gone for at least 24 hours without using medicine to reduce fever.   Follow-up care  Follow up with your healthcare provider, or as advised, i more often than directed. Take all of your medicine as directed even if you think you are better. Do not skip doses or stop your medicine early. Talk to your pediatrician regarding the use of this medicine in children.  While this drug may be prescribed fo professional for regular check ups. Tell your doctor if your symptoms do not start to get better or if they get worse. If you have the flu, you may be at an increased risk of developing seizures, confusion, or abnormal behavior.  This occurs early in the i

## 2021-01-04 ENCOUNTER — TELEPHONE (OUTPATIENT)
Dept: FAMILY MEDICINE CLINIC | Facility: CLINIC | Age: 41
End: 2021-01-04

## 2021-01-04 NOTE — TELEPHONE ENCOUNTER
Patient took covid test this weekend, came out negative. States that he still needs to quarantine in case it's a false negative. Has some questions. Would like a call back.

## 2021-01-04 NOTE — TELEPHONE ENCOUNTER
Patient's questions regarding quarantine time answered per CDC guidelines. Pt had no further questions at this time, but was encouraged to call back if he did.

## 2021-09-03 ENCOUNTER — LAB ENCOUNTER (OUTPATIENT)
Dept: LAB | Age: 41
End: 2021-09-03
Attending: FAMILY MEDICINE
Payer: COMMERCIAL

## 2021-09-03 ENCOUNTER — OFFICE VISIT (OUTPATIENT)
Dept: FAMILY MEDICINE CLINIC | Facility: CLINIC | Age: 41
End: 2021-09-03
Payer: COMMERCIAL

## 2021-09-03 VITALS
HEIGHT: 71 IN | DIASTOLIC BLOOD PRESSURE: 92 MMHG | WEIGHT: 243 LBS | HEART RATE: 91 BPM | SYSTOLIC BLOOD PRESSURE: 112 MMHG | RESPIRATION RATE: 18 BRPM | TEMPERATURE: 98 F | BODY MASS INDEX: 34.02 KG/M2 | OXYGEN SATURATION: 98 %

## 2021-09-03 DIAGNOSIS — Z00.00 HEALTH CARE MAINTENANCE: ICD-10-CM

## 2021-09-03 DIAGNOSIS — Z00.00 HEALTH CARE MAINTENANCE: Primary | ICD-10-CM

## 2021-09-03 LAB
ALBUMIN SERPL-MCNC: 3.7 G/DL (ref 3.4–5)
ALBUMIN/GLOB SERPL: 1 {RATIO} (ref 1–2)
ALP LIVER SERPL-CCNC: 75 U/L
ALT SERPL-CCNC: 37 U/L
ANION GAP SERPL CALC-SCNC: 7 MMOL/L (ref 0–18)
AST SERPL-CCNC: 23 U/L (ref 15–37)
BASOPHILS # BLD AUTO: 0.07 X10(3) UL (ref 0–0.2)
BASOPHILS NFR BLD AUTO: 1.1 %
BILIRUB SERPL-MCNC: 0.5 MG/DL (ref 0.1–2)
BILIRUB UR QL STRIP.AUTO: NEGATIVE
BUN BLD-MCNC: 14 MG/DL (ref 7–18)
CALCIUM BLD-MCNC: 9.2 MG/DL (ref 8.5–10.1)
CHLORIDE SERPL-SCNC: 107 MMOL/L (ref 98–112)
CHOLEST SMN-MCNC: 251 MG/DL (ref ?–200)
CLARITY UR REFRACT.AUTO: CLEAR
CO2 SERPL-SCNC: 25 MMOL/L (ref 21–32)
COLOR UR AUTO: YELLOW
CREAT BLD-MCNC: 1.08 MG/DL
EOSINOPHIL # BLD AUTO: 0.31 X10(3) UL (ref 0–0.7)
EOSINOPHIL NFR BLD AUTO: 4.8 %
ERYTHROCYTE [DISTWIDTH] IN BLOOD BY AUTOMATED COUNT: 13.2 %
GLOBULIN PLAS-MCNC: 3.8 G/DL (ref 2.8–4.4)
GLUCOSE BLD-MCNC: 92 MG/DL (ref 70–99)
GLUCOSE UR STRIP.AUTO-MCNC: NEGATIVE MG/DL
HCT VFR BLD AUTO: 49.6 %
HDLC SERPL-MCNC: 48 MG/DL (ref 40–59)
HGB BLD-MCNC: 16.8 G/DL
IMM GRANULOCYTES # BLD AUTO: 0.02 X10(3) UL (ref 0–1)
IMM GRANULOCYTES NFR BLD: 0.3 %
KETONES UR STRIP.AUTO-MCNC: NEGATIVE MG/DL
LDLC SERPL CALC-MCNC: 176 MG/DL (ref ?–100)
LEUKOCYTE ESTERASE UR QL STRIP.AUTO: NEGATIVE
LYMPHOCYTES # BLD AUTO: 1.8 X10(3) UL (ref 1–4)
LYMPHOCYTES NFR BLD AUTO: 28 %
M PROTEIN MFR SERPL ELPH: 7.5 G/DL (ref 6.4–8.2)
MCH RBC QN AUTO: 31.3 PG (ref 26–34)
MCHC RBC AUTO-ENTMCNC: 33.9 G/DL (ref 31–37)
MCV RBC AUTO: 92.4 FL
MONOCYTES # BLD AUTO: 0.46 X10(3) UL (ref 0.1–1)
MONOCYTES NFR BLD AUTO: 7.2 %
NEUTROPHILS # BLD AUTO: 3.77 X10 (3) UL (ref 1.5–7.7)
NEUTROPHILS # BLD AUTO: 3.77 X10(3) UL (ref 1.5–7.7)
NEUTROPHILS NFR BLD AUTO: 58.6 %
NITRITE UR QL STRIP.AUTO: NEGATIVE
NONHDLC SERPL-MCNC: 203 MG/DL (ref ?–130)
OSMOLALITY SERPL CALC.SUM OF ELEC: 288 MOSM/KG (ref 275–295)
PATIENT FASTING Y/N/NP: NO
PATIENT FASTING Y/N/NP: NO
PH UR STRIP.AUTO: 6 [PH] (ref 5–8)
PLATELET # BLD AUTO: 194 10(3)UL (ref 150–450)
POTASSIUM SERPL-SCNC: 4 MMOL/L (ref 3.5–5.1)
PROT UR STRIP.AUTO-MCNC: NEGATIVE MG/DL
RBC # BLD AUTO: 5.37 X10(6)UL
RBC UR QL AUTO: NEGATIVE
SODIUM SERPL-SCNC: 139 MMOL/L (ref 136–145)
SP GR UR STRIP.AUTO: 1.02 (ref 1–1.03)
TRIGL SERPL-MCNC: 148 MG/DL (ref 30–149)
TSI SER-ACNC: 1.72 MIU/ML (ref 0.36–3.74)
UROBILINOGEN UR STRIP.AUTO-MCNC: <2 MG/DL
VLDLC SERPL CALC-MCNC: 30 MG/DL (ref 0–30)
WBC # BLD AUTO: 6.4 X10(3) UL (ref 4–11)

## 2021-09-03 PROCEDURE — 3008F BODY MASS INDEX DOCD: CPT | Performed by: FAMILY MEDICINE

## 2021-09-03 PROCEDURE — 80061 LIPID PANEL: CPT | Performed by: FAMILY MEDICINE

## 2021-09-03 PROCEDURE — 3074F SYST BP LT 130 MM HG: CPT | Performed by: FAMILY MEDICINE

## 2021-09-03 PROCEDURE — 99395 PREV VISIT EST AGE 18-39: CPT | Performed by: FAMILY MEDICINE

## 2021-09-03 PROCEDURE — 3080F DIAST BP >= 90 MM HG: CPT | Performed by: FAMILY MEDICINE

## 2021-09-03 PROCEDURE — 80050 GENERAL HEALTH PANEL: CPT | Performed by: FAMILY MEDICINE

## 2021-09-03 PROCEDURE — 81003 URINALYSIS AUTO W/O SCOPE: CPT | Performed by: FAMILY MEDICINE

## 2021-09-03 NOTE — PATIENT INSTRUCTIONS
Good exam       Obtain labs today       Continue with focus on healthy nutrition and regular exercise. Recheck in 1 year.

## 2021-09-08 ENCOUNTER — TELEPHONE (OUTPATIENT)
Dept: FAMILY MEDICINE CLINIC | Facility: CLINIC | Age: 41
End: 2021-09-08

## 2021-09-08 NOTE — TELEPHONE ENCOUNTER
Patient is active on [employer] and report shows they have reviewed their recent lab/test results. [employer] message sent to patient with the below result note and recommendations.

## 2021-09-08 NOTE — TELEPHONE ENCOUNTER
----- Message from Octaviano Ruth MD sent at 9/8/2021  8:54 AM CDT -----  Labs reviewed. (Recent health check)Chemistry profile. Blood sugar normal.Kidney function liver function tests normal.Cholesterol profile. Total at 251, LDL at 176.   Values have

## 2021-09-13 NOTE — PROGRESS NOTES
Chief Complaint:   Patient presents with:  Physical      HPI:   This is a 46year old male coming in for health care check   Feeling well     Discussed healthy nutrition and regular exercise     Obtain labs.              HISTORY:  Past Medical History:   Bridgette Jones pain, visual changes,   Ears, Nose, Throat:  Denies hearing loss,or disturbance. Denies sore throat  INTEGUMENTARY:  Denies rashes, itching.   CARDIOVASCULAR: Denies  chest discomfort, palpitations, edema,  RESPIRATORY:  Denies shortness of breath, wheezing gait, strength and tone, sensory intact,   PSYCH: no depression or anxiety noted. ASSESSMENT AND PLAN:   1. Health care maintenance  Healthy    Stay active. Check labs. - CBC WITH DIFFERENTIAL WITH PLATELET;  Future  - COMP METABOLIC PANEL (14); Fut

## 2022-12-21 ENCOUNTER — OFFICE VISIT (OUTPATIENT)
Dept: FAMILY MEDICINE CLINIC | Facility: CLINIC | Age: 42
End: 2022-12-21
Payer: COMMERCIAL

## 2022-12-21 VITALS
RESPIRATION RATE: 16 BRPM | HEIGHT: 71 IN | OXYGEN SATURATION: 98 % | HEART RATE: 98 BPM | WEIGHT: 240 LBS | SYSTOLIC BLOOD PRESSURE: 114 MMHG | DIASTOLIC BLOOD PRESSURE: 82 MMHG | TEMPERATURE: 98 F | BODY MASS INDEX: 33.6 KG/M2

## 2022-12-21 DIAGNOSIS — S09.90XA INJURY OF HEAD, INITIAL ENCOUNTER: ICD-10-CM

## 2022-12-21 DIAGNOSIS — R68.89 FLU-LIKE SYMPTOMS: Primary | ICD-10-CM

## 2022-12-21 PROCEDURE — 3079F DIAST BP 80-89 MM HG: CPT | Performed by: NURSE PRACTITIONER

## 2022-12-21 PROCEDURE — 3008F BODY MASS INDEX DOCD: CPT | Performed by: NURSE PRACTITIONER

## 2022-12-21 PROCEDURE — 99215 OFFICE O/P EST HI 40 MIN: CPT | Performed by: NURSE PRACTITIONER

## 2022-12-21 PROCEDURE — 3074F SYST BP LT 130 MM HG: CPT | Performed by: NURSE PRACTITIONER

## 2022-12-21 PROCEDURE — 87637 SARSCOV2&INF A&B&RSV AMP PRB: CPT | Performed by: NURSE PRACTITIONER

## 2022-12-21 RX ORDER — ONDANSETRON HYDROCHLORIDE 8 MG/1
8 TABLET, FILM COATED ORAL EVERY 8 HOURS PRN
Qty: 12 TABLET | Refills: 0 | Status: SHIPPED | OUTPATIENT
Start: 2022-12-21

## 2022-12-21 NOTE — PATIENT INSTRUCTIONS
Viral swab today  Await test results  Fluids, rest, steam, humidifier  Epsom salt soaks for body aches  Clear liquids, broth, broth based soups, popsicles, gatorade, pedialyte  Zofran 1 every 8 hours if needed for nausea  If disoriented again or neuro changes then ER/911

## 2022-12-22 ENCOUNTER — TELEPHONE (OUTPATIENT)
Dept: FAMILY MEDICINE CLINIC | Facility: CLINIC | Age: 42
End: 2022-12-22

## 2022-12-22 LAB
FLUAV + FLUBV RNA SPEC NAA+PROBE: NOT DETECTED
FLUAV + FLUBV RNA SPEC NAA+PROBE: NOT DETECTED
RSV RNA SPEC NAA+PROBE: NOT DETECTED
SARS-COV-2 RNA RESP QL NAA+PROBE: NOT DETECTED

## 2022-12-22 NOTE — TELEPHONE ENCOUNTER
----- Message from NATA Jensen sent at 12/22/2022 11:18 AM CST -----  Reyes Dame, APRN, is out of the office today. Please let patient know the swab for COVID/flu/RSV is negative. Thank you.   Note to Yannick

## 2022-12-22 NOTE — TELEPHONE ENCOUNTER
Patient informed. His wife said she has observed Ese Cummins having disorientation and memory loss. It seems to correlate when he is having a headache. They would like to know what to do at this point.

## 2023-01-03 ENCOUNTER — OFFICE VISIT (OUTPATIENT)
Dept: FAMILY MEDICINE CLINIC | Facility: CLINIC | Age: 43
End: 2023-01-03
Payer: COMMERCIAL

## 2023-01-03 ENCOUNTER — LAB ENCOUNTER (OUTPATIENT)
Dept: LAB | Age: 43
End: 2023-01-03
Attending: FAMILY MEDICINE
Payer: COMMERCIAL

## 2023-01-03 VITALS
WEIGHT: 240.19 LBS | RESPIRATION RATE: 16 BRPM | BODY MASS INDEX: 33.63 KG/M2 | OXYGEN SATURATION: 98 % | TEMPERATURE: 98 F | SYSTOLIC BLOOD PRESSURE: 150 MMHG | HEART RATE: 96 BPM | HEIGHT: 71 IN | DIASTOLIC BLOOD PRESSURE: 94 MMHG

## 2023-01-03 DIAGNOSIS — Z00.00 HEALTH CARE MAINTENANCE: ICD-10-CM

## 2023-01-03 DIAGNOSIS — I71.21 ANEURYSM OF ASCENDING AORTA WITHOUT RUPTURE: Primary | ICD-10-CM

## 2023-01-03 DIAGNOSIS — R16.1 SPLENOMEGALY: ICD-10-CM

## 2023-01-03 PROCEDURE — 3008F BODY MASS INDEX DOCD: CPT | Performed by: FAMILY MEDICINE

## 2023-01-03 PROCEDURE — 3080F DIAST BP >= 90 MM HG: CPT | Performed by: FAMILY MEDICINE

## 2023-01-03 PROCEDURE — 99214 OFFICE O/P EST MOD 30 MIN: CPT | Performed by: FAMILY MEDICINE

## 2023-01-03 PROCEDURE — 3077F SYST BP >= 140 MM HG: CPT | Performed by: FAMILY MEDICINE

## 2023-01-03 RX ORDER — IBUPROFEN 600 MG/1
TABLET ORAL
COMMUNITY
Start: 2023-01-01

## 2023-01-03 NOTE — PATIENT INSTRUCTIONS
Good exam      Plan for repeat of labs - ( fasting )     Follow up appointment in 3 months     Plan for cardiology appointment at that time

## 2023-04-03 ENCOUNTER — LABORATORY ENCOUNTER (OUTPATIENT)
Dept: LAB | Age: 43
End: 2023-04-03
Attending: FAMILY MEDICINE
Payer: COMMERCIAL

## 2023-04-03 ENCOUNTER — OFFICE VISIT (OUTPATIENT)
Dept: FAMILY MEDICINE CLINIC | Facility: CLINIC | Age: 43
End: 2023-04-03
Payer: COMMERCIAL

## 2023-04-03 VITALS
DIASTOLIC BLOOD PRESSURE: 80 MMHG | HEIGHT: 71 IN | WEIGHT: 242 LBS | RESPIRATION RATE: 14 BRPM | HEART RATE: 109 BPM | OXYGEN SATURATION: 97 % | BODY MASS INDEX: 33.88 KG/M2 | TEMPERATURE: 98 F | SYSTOLIC BLOOD PRESSURE: 120 MMHG

## 2023-04-03 DIAGNOSIS — Z00.00 HEALTH CARE MAINTENANCE: ICD-10-CM

## 2023-04-03 DIAGNOSIS — I71.21 ANEURYSM OF ASCENDING AORTA WITHOUT RUPTURE (HCC): Primary | ICD-10-CM

## 2023-04-03 DIAGNOSIS — R16.1 SPLENOMEGALY: ICD-10-CM

## 2023-04-03 LAB
ALBUMIN SERPL-MCNC: 3.7 G/DL (ref 3.4–5)
ALBUMIN/GLOB SERPL: 1.1 {RATIO} (ref 1–2)
ALP LIVER SERPL-CCNC: 75 U/L
ALT SERPL-CCNC: 35 U/L
ANION GAP SERPL CALC-SCNC: 3 MMOL/L (ref 0–18)
AST SERPL-CCNC: 28 U/L (ref 15–37)
BASOPHILS # BLD AUTO: 0.05 X10(3) UL (ref 0–0.2)
BASOPHILS NFR BLD AUTO: 0.8 %
BILIRUB SERPL-MCNC: 0.5 MG/DL (ref 0.1–2)
BUN BLD-MCNC: 16 MG/DL (ref 7–18)
CALCIUM BLD-MCNC: 8.7 MG/DL (ref 8.5–10.1)
CHLORIDE SERPL-SCNC: 108 MMOL/L (ref 98–112)
CHOLEST SERPL-MCNC: 232 MG/DL (ref ?–200)
CO2 SERPL-SCNC: 27 MMOL/L (ref 21–32)
CREAT BLD-MCNC: 1.05 MG/DL
EOSINOPHIL # BLD AUTO: 0.3 X10(3) UL (ref 0–0.7)
EOSINOPHIL NFR BLD AUTO: 4.7 %
ERYTHROCYTE [DISTWIDTH] IN BLOOD BY AUTOMATED COUNT: 12.9 %
ERYTHROCYTE [SEDIMENTATION RATE] IN BLOOD: 10 MM/HR
FASTING PATIENT LIPID ANSWER: YES
FASTING STATUS PATIENT QL REPORTED: YES
GFR SERPLBLD BASED ON 1.73 SQ M-ARVRAT: 92 ML/MIN/1.73M2 (ref 60–?)
GLOBULIN PLAS-MCNC: 3.5 G/DL (ref 2.8–4.4)
GLUCOSE BLD-MCNC: 90 MG/DL (ref 70–99)
HCT VFR BLD AUTO: 48.9 %
HDLC SERPL-MCNC: 44 MG/DL (ref 40–59)
HGB BLD-MCNC: 16.4 G/DL
IMM GRANULOCYTES # BLD AUTO: 0.02 X10(3) UL (ref 0–1)
IMM GRANULOCYTES NFR BLD: 0.3 %
LDLC SERPL CALC-MCNC: 159 MG/DL (ref ?–100)
LYMPHOCYTES # BLD AUTO: 1.77 X10(3) UL (ref 1–4)
LYMPHOCYTES NFR BLD AUTO: 28 %
MCH RBC QN AUTO: 31.1 PG (ref 26–34)
MCHC RBC AUTO-ENTMCNC: 33.5 G/DL (ref 31–37)
MCV RBC AUTO: 92.6 FL
MONOCYTES # BLD AUTO: 0.57 X10(3) UL (ref 0.1–1)
MONOCYTES NFR BLD AUTO: 9 %
NEUTROPHILS # BLD AUTO: 3.62 X10 (3) UL (ref 1.5–7.7)
NEUTROPHILS # BLD AUTO: 3.62 X10(3) UL (ref 1.5–7.7)
NEUTROPHILS NFR BLD AUTO: 57.2 %
NONHDLC SERPL-MCNC: 188 MG/DL (ref ?–130)
OSMOLALITY SERPL CALC.SUM OF ELEC: 287 MOSM/KG (ref 275–295)
PLATELET # BLD AUTO: 195 10(3)UL (ref 150–450)
POTASSIUM SERPL-SCNC: 3.9 MMOL/L (ref 3.5–5.1)
PROT SERPL-MCNC: 7.2 G/DL (ref 6.4–8.2)
RBC # BLD AUTO: 5.28 X10(6)UL
SODIUM SERPL-SCNC: 138 MMOL/L (ref 136–145)
TRIGL SERPL-MCNC: 159 MG/DL (ref 30–149)
VLDLC SERPL CALC-MCNC: 31 MG/DL (ref 0–30)
WBC # BLD AUTO: 6.3 X10(3) UL (ref 4–11)

## 2023-04-03 PROCEDURE — 80061 LIPID PANEL: CPT | Performed by: FAMILY MEDICINE

## 2023-04-03 PROCEDURE — 85652 RBC SED RATE AUTOMATED: CPT | Performed by: FAMILY MEDICINE

## 2023-04-03 PROCEDURE — 80053 COMPREHEN METABOLIC PANEL: CPT | Performed by: FAMILY MEDICINE

## 2023-04-03 PROCEDURE — 99214 OFFICE O/P EST MOD 30 MIN: CPT | Performed by: FAMILY MEDICINE

## 2023-04-03 PROCEDURE — 3079F DIAST BP 80-89 MM HG: CPT | Performed by: FAMILY MEDICINE

## 2023-04-03 PROCEDURE — 3074F SYST BP LT 130 MM HG: CPT | Performed by: FAMILY MEDICINE

## 2023-04-03 PROCEDURE — 85025 COMPLETE CBC W/AUTO DIFF WBC: CPT | Performed by: FAMILY MEDICINE

## 2023-04-03 PROCEDURE — 3008F BODY MASS INDEX DOCD: CPT | Performed by: FAMILY MEDICINE

## 2023-04-03 RX ORDER — FLUTICASONE PROPIONATE 50 MCG
SPRAY, SUSPENSION (ML) NASAL
COMMUNITY

## 2023-04-07 ENCOUNTER — TELEPHONE (OUTPATIENT)
Dept: FAMILY MEDICINE CLINIC | Facility: CLINIC | Age: 43
End: 2023-04-07

## 2023-04-07 DIAGNOSIS — E78.00 PURE HYPERCHOLESTEROLEMIA: Primary | ICD-10-CM

## 2023-04-07 RX ORDER — PRAVASTATIN SODIUM 20 MG
20 TABLET ORAL NIGHTLY
Qty: 90 TABLET | Refills: 1 | Status: SHIPPED | OUTPATIENT
Start: 2023-04-07

## 2023-04-07 NOTE — TELEPHONE ENCOUNTER
----- Message from Radha Ma MD sent at 4/7/2023 11:27 AM CDT -----  Labs reviewed. (Recent health check)    Cholesterol total 232, . Persisting elevation of LDL cholesterol. In light of discussion about aneurysm would recommend starting statin medication. Recommend pravastatin 20 mg once daily. Recommend recheck of lab in 3 to 6 months. Other labs include metabolic panel with normal blood sugar, kidney function and liver function testing.     Sed rate normal.    Blood count normal.

## 2023-04-07 NOTE — TELEPHONE ENCOUNTER
Pt informed of lab results and recommendations. Pt verbalizes understanding and agrees to start statin medication. Order pended and routed to Dr. Albert Mayorga for review.

## 2023-05-10 ENCOUNTER — LAB ENCOUNTER (OUTPATIENT)
Dept: LAB | Age: 43
End: 2023-05-10
Attending: FAMILY MEDICINE
Payer: COMMERCIAL

## 2023-05-10 ENCOUNTER — OFFICE VISIT (OUTPATIENT)
Dept: FAMILY MEDICINE CLINIC | Facility: CLINIC | Age: 43
End: 2023-05-10
Payer: COMMERCIAL

## 2023-05-10 VITALS
DIASTOLIC BLOOD PRESSURE: 76 MMHG | HEIGHT: 71 IN | TEMPERATURE: 97 F | HEART RATE: 119 BPM | SYSTOLIC BLOOD PRESSURE: 130 MMHG | BODY MASS INDEX: 33.77 KG/M2 | RESPIRATION RATE: 16 BRPM | OXYGEN SATURATION: 97 % | WEIGHT: 241.19 LBS

## 2023-05-10 DIAGNOSIS — Z01.818 PREOPERATIVE EXAMINATION: Primary | ICD-10-CM

## 2023-05-10 DIAGNOSIS — Z01.818 PREOPERATIVE EXAMINATION: ICD-10-CM

## 2023-05-10 LAB
ALBUMIN SERPL-MCNC: 3.9 G/DL (ref 3.4–5)
ALBUMIN/GLOB SERPL: 1.1 {RATIO} (ref 1–2)
ALP LIVER SERPL-CCNC: 79 U/L
ALT SERPL-CCNC: 36 U/L
ANION GAP SERPL CALC-SCNC: 0 MMOL/L (ref 0–18)
AST SERPL-CCNC: 22 U/L (ref 15–37)
BASOPHILS # BLD AUTO: 0.08 X10(3) UL (ref 0–0.2)
BASOPHILS NFR BLD AUTO: 1 %
BILIRUB SERPL-MCNC: 0.3 MG/DL (ref 0.1–2)
BILIRUB UR QL STRIP.AUTO: NEGATIVE
BUN BLD-MCNC: 20 MG/DL (ref 7–18)
CALCIUM BLD-MCNC: 9.1 MG/DL (ref 8.5–10.1)
CHLORIDE SERPL-SCNC: 109 MMOL/L (ref 98–112)
CLARITY UR REFRACT.AUTO: CLEAR
CO2 SERPL-SCNC: 28 MMOL/L (ref 21–32)
COLOR UR AUTO: YELLOW
CREAT BLD-MCNC: 1.29 MG/DL
EOSINOPHIL # BLD AUTO: 0.34 X10(3) UL (ref 0–0.7)
EOSINOPHIL NFR BLD AUTO: 4.1 %
ERYTHROCYTE [DISTWIDTH] IN BLOOD BY AUTOMATED COUNT: 12.9 %
FASTING STATUS PATIENT QL REPORTED: NO
GFR SERPLBLD BASED ON 1.73 SQ M-ARVRAT: 72 ML/MIN/1.73M2 (ref 60–?)
GLOBULIN PLAS-MCNC: 3.6 G/DL (ref 2.8–4.4)
GLUCOSE BLD-MCNC: 124 MG/DL (ref 70–99)
GLUCOSE UR STRIP.AUTO-MCNC: NEGATIVE MG/DL
HCT VFR BLD AUTO: 52.9 %
HGB BLD-MCNC: 17.6 G/DL
IMM GRANULOCYTES # BLD AUTO: 0.02 X10(3) UL (ref 0–1)
IMM GRANULOCYTES NFR BLD: 0.2 %
KETONES UR STRIP.AUTO-MCNC: NEGATIVE MG/DL
LEUKOCYTE ESTERASE UR QL STRIP.AUTO: NEGATIVE
LYMPHOCYTES # BLD AUTO: 2.08 X10(3) UL (ref 1–4)
LYMPHOCYTES NFR BLD AUTO: 25.2 %
MCH RBC QN AUTO: 31.7 PG (ref 26–34)
MCHC RBC AUTO-ENTMCNC: 33.3 G/DL (ref 31–37)
MCV RBC AUTO: 95.1 FL
MONOCYTES # BLD AUTO: 0.57 X10(3) UL (ref 0.1–1)
MONOCYTES NFR BLD AUTO: 6.9 %
NEUTROPHILS # BLD AUTO: 5.18 X10 (3) UL (ref 1.5–7.7)
NEUTROPHILS # BLD AUTO: 5.18 X10(3) UL (ref 1.5–7.7)
NEUTROPHILS NFR BLD AUTO: 62.6 %
NITRITE UR QL STRIP.AUTO: NEGATIVE
OSMOLALITY SERPL CALC.SUM OF ELEC: 288 MOSM/KG (ref 275–295)
PH UR STRIP.AUTO: 5 [PH] (ref 5–8)
PLATELET # BLD AUTO: 210 10(3)UL (ref 150–450)
POTASSIUM SERPL-SCNC: 4.4 MMOL/L (ref 3.5–5.1)
PROT SERPL-MCNC: 7.5 G/DL (ref 6.4–8.2)
PROT UR STRIP.AUTO-MCNC: NEGATIVE MG/DL
RBC # BLD AUTO: 5.56 X10(6)UL
RBC UR QL AUTO: NEGATIVE
SODIUM SERPL-SCNC: 137 MMOL/L (ref 136–145)
SP GR UR STRIP.AUTO: 1.02 (ref 1–1.03)
UROBILINOGEN UR STRIP.AUTO-MCNC: <2 MG/DL
WBC # BLD AUTO: 8.3 X10(3) UL (ref 4–11)

## 2023-05-10 PROCEDURE — 99243 OFF/OP CNSLTJ NEW/EST LOW 30: CPT | Performed by: FAMILY MEDICINE

## 2023-05-10 PROCEDURE — 3078F DIAST BP <80 MM HG: CPT | Performed by: FAMILY MEDICINE

## 2023-05-10 PROCEDURE — 3008F BODY MASS INDEX DOCD: CPT | Performed by: FAMILY MEDICINE

## 2023-05-10 PROCEDURE — 81001 URINALYSIS AUTO W/SCOPE: CPT | Performed by: FAMILY MEDICINE

## 2023-05-10 PROCEDURE — 80053 COMPREHEN METABOLIC PANEL: CPT | Performed by: FAMILY MEDICINE

## 2023-05-10 PROCEDURE — 3075F SYST BP GE 130 - 139MM HG: CPT | Performed by: FAMILY MEDICINE

## 2023-05-10 PROCEDURE — 85025 COMPLETE CBC W/AUTO DIFF WBC: CPT | Performed by: FAMILY MEDICINE

## (undated) NOTE — LETTER
Date: 2/11/2020    Patient Name: Feliz Freed          To Whom it may concern: This letter has been written at the patient's request. The above patient was seen at the [facility] for treatment of a medical condition.     This patient laurie